# Patient Record
Sex: MALE | Race: WHITE | NOT HISPANIC OR LATINO | Employment: FULL TIME | URBAN - METROPOLITAN AREA
[De-identification: names, ages, dates, MRNs, and addresses within clinical notes are randomized per-mention and may not be internally consistent; named-entity substitution may affect disease eponyms.]

---

## 2019-01-17 ENCOUNTER — AMBULATORY - HEALTHEAST (OUTPATIENT)
Dept: CARDIAC REHAB | Facility: CLINIC | Age: 59
End: 2019-01-17

## 2019-01-17 DIAGNOSIS — I21.4 NON-ST ELEVATION MI (NSTEMI) (H): ICD-10-CM

## 2019-01-17 DIAGNOSIS — Z95.5 STENTED CORONARY ARTERY: ICD-10-CM

## 2019-03-01 ENCOUNTER — AMBULATORY - HEALTHEAST (OUTPATIENT)
Dept: CARDIAC REHAB | Facility: CLINIC | Age: 59
End: 2019-03-01

## 2019-03-01 DIAGNOSIS — Z95.5 STENTED CORONARY ARTERY: ICD-10-CM

## 2019-03-06 ENCOUNTER — AMBULATORY - HEALTHEAST (OUTPATIENT)
Dept: CARDIAC REHAB | Facility: CLINIC | Age: 59
End: 2019-03-06

## 2019-03-06 DIAGNOSIS — Z95.5 STENTED CORONARY ARTERY: ICD-10-CM

## 2019-03-06 DIAGNOSIS — I21.4 NSTEMI (NON-ST ELEVATION MYOCARDIAL INFARCTION) (H): ICD-10-CM

## 2019-03-06 RX ORDER — CLOPIDOGREL BISULFATE 75 MG/1
75 TABLET ORAL DAILY
Status: SHIPPED | COMMUNITY
Start: 2019-03-06

## 2019-03-06 RX ORDER — LISINOPRIL 10 MG/1
10 TABLET ORAL DAILY
Status: SHIPPED | COMMUNITY
Start: 2019-03-06

## 2019-03-06 RX ORDER — METOPROLOL TARTRATE 25 MG/1
25 TABLET, FILM COATED ORAL 2 TIMES DAILY
Status: SHIPPED | COMMUNITY
Start: 2019-03-06

## 2019-03-06 RX ORDER — ALBUTEROL SULFATE 90 UG/1
2 AEROSOL, METERED RESPIRATORY (INHALATION) EVERY 6 HOURS PRN
Status: SHIPPED | COMMUNITY
Start: 2019-03-06

## 2019-03-06 RX ORDER — LEVETIRACETAM 500 MG/1
500 TABLET ORAL DAILY
Status: SHIPPED | COMMUNITY
Start: 2019-03-06

## 2019-03-06 RX ORDER — ROSUVASTATIN CALCIUM 10 MG/1
10 TABLET, COATED ORAL AT BEDTIME
Status: SHIPPED | COMMUNITY
Start: 2019-03-06

## 2019-03-06 RX ORDER — NITROGLYCERIN 0.4 MG/1
0.4 TABLET SUBLINGUAL EVERY 5 MIN PRN
Status: SHIPPED | COMMUNITY
Start: 2019-03-06

## 2019-03-12 ENCOUNTER — AMBULATORY - HEALTHEAST (OUTPATIENT)
Dept: CARDIAC REHAB | Facility: CLINIC | Age: 59
End: 2019-03-12

## 2019-03-12 DIAGNOSIS — Z95.5 STENTED CORONARY ARTERY: ICD-10-CM

## 2019-03-12 DIAGNOSIS — I21.4 NSTEMI (NON-ST ELEVATION MYOCARDIAL INFARCTION) (H): ICD-10-CM

## 2019-03-14 ENCOUNTER — AMBULATORY - HEALTHEAST (OUTPATIENT)
Dept: CARDIAC REHAB | Facility: CLINIC | Age: 59
End: 2019-03-14

## 2019-03-14 DIAGNOSIS — I21.4 NSTEMI (NON-ST ELEVATION MYOCARDIAL INFARCTION) (H): ICD-10-CM

## 2019-03-14 DIAGNOSIS — Z95.5 STENTED CORONARY ARTERY: ICD-10-CM

## 2019-03-19 ENCOUNTER — AMBULATORY - HEALTHEAST (OUTPATIENT)
Dept: CARDIAC REHAB | Facility: CLINIC | Age: 59
End: 2019-03-19

## 2019-03-19 DIAGNOSIS — I21.4 NSTEMI (NON-ST ELEVATION MYOCARDIAL INFARCTION) (H): ICD-10-CM

## 2019-03-19 DIAGNOSIS — Z95.5 STENTED CORONARY ARTERY: ICD-10-CM

## 2019-03-21 ENCOUNTER — AMBULATORY - HEALTHEAST (OUTPATIENT)
Dept: CARDIAC REHAB | Facility: CLINIC | Age: 59
End: 2019-03-21

## 2019-03-21 DIAGNOSIS — I21.4 NSTEMI (NON-ST ELEVATION MYOCARDIAL INFARCTION) (H): ICD-10-CM

## 2019-03-21 DIAGNOSIS — Z95.5 STENTED CORONARY ARTERY: ICD-10-CM

## 2019-03-26 ENCOUNTER — AMBULATORY - HEALTHEAST (OUTPATIENT)
Dept: CARDIAC REHAB | Facility: CLINIC | Age: 59
End: 2019-03-26

## 2019-03-26 DIAGNOSIS — I21.4 NSTEMI (NON-ST ELEVATION MYOCARDIAL INFARCTION) (H): ICD-10-CM

## 2019-03-26 DIAGNOSIS — Z95.5 STENTED CORONARY ARTERY: ICD-10-CM

## 2019-03-28 ENCOUNTER — AMBULATORY - HEALTHEAST (OUTPATIENT)
Dept: CARDIAC REHAB | Facility: CLINIC | Age: 59
End: 2019-03-28

## 2019-03-28 DIAGNOSIS — I21.4 NSTEMI (NON-ST ELEVATION MYOCARDIAL INFARCTION) (H): ICD-10-CM

## 2019-03-28 DIAGNOSIS — Z95.5 STENTED CORONARY ARTERY: ICD-10-CM

## 2019-04-02 ENCOUNTER — AMBULATORY - HEALTHEAST (OUTPATIENT)
Dept: CARDIAC REHAB | Facility: CLINIC | Age: 59
End: 2019-04-02

## 2019-04-02 DIAGNOSIS — Z95.5 STENTED CORONARY ARTERY: ICD-10-CM

## 2019-04-02 DIAGNOSIS — I21.4 NSTEMI (NON-ST ELEVATION MYOCARDIAL INFARCTION) (H): ICD-10-CM

## 2019-04-09 ENCOUNTER — AMBULATORY - HEALTHEAST (OUTPATIENT)
Dept: CARDIAC REHAB | Facility: CLINIC | Age: 59
End: 2019-04-09

## 2019-04-09 DIAGNOSIS — I21.4 NSTEMI (NON-ST ELEVATION MYOCARDIAL INFARCTION) (H): ICD-10-CM

## 2019-04-09 DIAGNOSIS — Z95.5 STENTED CORONARY ARTERY: ICD-10-CM

## 2019-04-11 ENCOUNTER — AMBULATORY - HEALTHEAST (OUTPATIENT)
Dept: CARDIAC REHAB | Facility: CLINIC | Age: 59
End: 2019-04-11

## 2019-04-11 DIAGNOSIS — I21.4 NSTEMI (NON-ST ELEVATION MYOCARDIAL INFARCTION) (H): ICD-10-CM

## 2019-04-11 DIAGNOSIS — Z95.5 STENTED CORONARY ARTERY: ICD-10-CM

## 2019-04-16 ENCOUNTER — AMBULATORY - HEALTHEAST (OUTPATIENT)
Dept: CARDIAC REHAB | Facility: CLINIC | Age: 59
End: 2019-04-16

## 2019-04-16 DIAGNOSIS — I21.4 NSTEMI (NON-ST ELEVATION MYOCARDIAL INFARCTION) (H): ICD-10-CM

## 2019-04-16 DIAGNOSIS — Z95.5 STENTED CORONARY ARTERY: ICD-10-CM

## 2019-04-18 ENCOUNTER — AMBULATORY - HEALTHEAST (OUTPATIENT)
Dept: CARDIAC REHAB | Facility: CLINIC | Age: 59
End: 2019-04-18

## 2019-04-18 DIAGNOSIS — I21.4 NSTEMI (NON-ST ELEVATION MYOCARDIAL INFARCTION) (H): ICD-10-CM

## 2019-04-18 DIAGNOSIS — Z95.5 STENTED CORONARY ARTERY: ICD-10-CM

## 2019-04-23 ENCOUNTER — AMBULATORY - HEALTHEAST (OUTPATIENT)
Dept: CARDIAC REHAB | Facility: CLINIC | Age: 59
End: 2019-04-23

## 2019-04-23 DIAGNOSIS — I21.4 NSTEMI (NON-ST ELEVATION MYOCARDIAL INFARCTION) (H): ICD-10-CM

## 2019-04-23 DIAGNOSIS — Z95.5 STENTED CORONARY ARTERY: ICD-10-CM

## 2019-04-25 ENCOUNTER — AMBULATORY - HEALTHEAST (OUTPATIENT)
Dept: CARDIAC REHAB | Facility: CLINIC | Age: 59
End: 2019-04-25

## 2019-04-25 DIAGNOSIS — I21.4 NSTEMI (NON-ST ELEVATION MYOCARDIAL INFARCTION) (H): ICD-10-CM

## 2019-04-25 DIAGNOSIS — Z95.5 STENTED CORONARY ARTERY: ICD-10-CM

## 2019-04-30 ENCOUNTER — AMBULATORY - HEALTHEAST (OUTPATIENT)
Dept: CARDIAC REHAB | Facility: CLINIC | Age: 59
End: 2019-04-30

## 2019-04-30 DIAGNOSIS — I21.4 NSTEMI (NON-ST ELEVATION MYOCARDIAL INFARCTION) (H): ICD-10-CM

## 2019-04-30 DIAGNOSIS — Z95.5 STENTED CORONARY ARTERY: ICD-10-CM

## 2019-05-02 ENCOUNTER — AMBULATORY - HEALTHEAST (OUTPATIENT)
Dept: CARDIAC REHAB | Facility: CLINIC | Age: 59
End: 2019-05-02

## 2019-05-02 DIAGNOSIS — Z95.5 STENTED CORONARY ARTERY: ICD-10-CM

## 2019-05-02 DIAGNOSIS — I21.4 NSTEMI (NON-ST ELEVATION MYOCARDIAL INFARCTION) (H): ICD-10-CM

## 2019-05-07 ENCOUNTER — AMBULATORY - HEALTHEAST (OUTPATIENT)
Dept: CARDIAC REHAB | Facility: CLINIC | Age: 59
End: 2019-05-07

## 2019-05-07 DIAGNOSIS — I21.4 NSTEMI (NON-ST ELEVATION MYOCARDIAL INFARCTION) (H): ICD-10-CM

## 2019-05-07 DIAGNOSIS — Z95.5 STENTED CORONARY ARTERY: ICD-10-CM

## 2019-05-09 ENCOUNTER — AMBULATORY - HEALTHEAST (OUTPATIENT)
Dept: CARDIAC REHAB | Facility: CLINIC | Age: 59
End: 2019-05-09

## 2019-05-09 DIAGNOSIS — Z95.5 STENTED CORONARY ARTERY: ICD-10-CM

## 2019-05-09 DIAGNOSIS — I21.4 NSTEMI (NON-ST ELEVATION MYOCARDIAL INFARCTION) (H): ICD-10-CM

## 2019-05-14 ENCOUNTER — AMBULATORY - HEALTHEAST (OUTPATIENT)
Dept: CARDIAC REHAB | Facility: CLINIC | Age: 59
End: 2019-05-14

## 2019-05-14 DIAGNOSIS — Z95.5 STENTED CORONARY ARTERY: ICD-10-CM

## 2019-05-14 DIAGNOSIS — I21.4 NSTEMI (NON-ST ELEVATION MYOCARDIAL INFARCTION) (H): ICD-10-CM

## 2019-05-16 ENCOUNTER — AMBULATORY - HEALTHEAST (OUTPATIENT)
Dept: CARDIAC REHAB | Facility: CLINIC | Age: 59
End: 2019-05-16

## 2019-05-16 DIAGNOSIS — I21.4 NSTEMI (NON-ST ELEVATION MYOCARDIAL INFARCTION) (H): ICD-10-CM

## 2019-05-16 DIAGNOSIS — Z95.5 STENTED CORONARY ARTERY: ICD-10-CM

## 2019-05-21 ENCOUNTER — AMBULATORY - HEALTHEAST (OUTPATIENT)
Dept: CARDIAC REHAB | Facility: CLINIC | Age: 59
End: 2019-05-21

## 2019-05-21 DIAGNOSIS — Z95.5 STENTED CORONARY ARTERY: ICD-10-CM

## 2019-05-21 DIAGNOSIS — I21.4 NSTEMI (NON-ST ELEVATION MYOCARDIAL INFARCTION) (H): ICD-10-CM

## 2019-05-23 ENCOUNTER — AMBULATORY - HEALTHEAST (OUTPATIENT)
Dept: CARDIAC REHAB | Facility: CLINIC | Age: 59
End: 2019-05-23

## 2019-05-23 DIAGNOSIS — I21.4 NSTEMI (NON-ST ELEVATION MYOCARDIAL INFARCTION) (H): ICD-10-CM

## 2019-05-23 DIAGNOSIS — Z95.5 STENTED CORONARY ARTERY: ICD-10-CM

## 2019-05-28 ENCOUNTER — AMBULATORY - HEALTHEAST (OUTPATIENT)
Dept: CARDIAC REHAB | Facility: CLINIC | Age: 59
End: 2019-05-28

## 2019-05-28 DIAGNOSIS — I21.4 NSTEMI (NON-ST ELEVATION MYOCARDIAL INFARCTION) (H): ICD-10-CM

## 2019-05-28 DIAGNOSIS — Z95.5 STENTED CORONARY ARTERY: ICD-10-CM

## 2019-05-30 ENCOUNTER — AMBULATORY - HEALTHEAST (OUTPATIENT)
Dept: CARDIAC REHAB | Facility: CLINIC | Age: 59
End: 2019-05-30

## 2019-05-30 DIAGNOSIS — I21.4 NSTEMI (NON-ST ELEVATION MYOCARDIAL INFARCTION) (H): ICD-10-CM

## 2019-05-30 DIAGNOSIS — Z95.5 STENTED CORONARY ARTERY: ICD-10-CM

## 2021-05-27 NOTE — PROGRESS NOTES
ITP ASSESSMENT   Assessment Day: 30 Day    Session Number: 9  Precautions: Standard cardiac sx, factor 5, s/p TIA, epilepsy, asthma, bicuspid AV/AS    Diagnosis: MI;Stent    Risk Stratification: High    Referring Provider: Vicente Lockett MD   ITP sent to Dr. Chu  EXERCISE  Exercise Assessment: Reassessment         Symptoms:  Peak Symptoms: calf soreness      5 mins. Post  5 Min Post HR: 73    5 Min Post BP: 102/62                           Exercise Plan  Goals Next 30 days  ADL'S: Resume yardwork    Leisure: Resume [employer]    Work: Independent with duties.      Education Goals: Patient can state cardiac s/s and appropriate emergency response.    Education Goals Met: Has system for taking medication.;Medication review.                          Goals Met  Initial ADL's goals met: Goal met- pt has carried 15 lbs up stairs.    Initial Leisure goals met: Goal met- pt has resumed vacuuming.    Intial Work goals met: Independent      Exercise Prescription  Exercise Mode: Treadmill;Bike;Nustep;Arm Erg.;Stairs;Hallway Walking    Frequency: 2x/week    Duration: 30-45 mins    Intensity / THR: 20-30 beats above resting heart rate    RPE 11-14  Progression / Met level: 5.5-7    Resistive Training?: Yes      Current Exercise (mins/week): 180      Interventions  Home Exercise:  Mode: Walking    Frequency: 3-5x/week    Duration: 20-40 mins      Education Material : Educational videos;Provide written material;Offer educational classes;Individual education and counseling      Education Completed  Exercise Education Completed: Cardiac Anatomy;Signs and Symptoms;Medication review;RPE;Emergency Plan;Home Exercise;Warm up/cool down;FITT Principles;BP/HR Reponse to exercise;Stretching;Strength training;Benefits of Exercise;End point of exercise              Exercise Follow-up/Discharge  Follow up/Discharge: Pt has reached peak of 5.2 METs on TM.  Pt reports walking regularly at home and at work.           NUTRITION  Nutrition  "Assessment: Reassessment      Nutrition Risk Factors:  Nutrition Risk Factors: Dyslipidemia;Overweight  Cholesterol: 242 (1/12/19)  LDL: 172  HDL: 41  Triglycerides: 145      Nutrition Plan  Interventions  Diet Consult: Completed    Other Nutrition Intervention: Therapist/Pt Discussion;Educational Videos;Provide with Written Material    Initial Rate Your Plate Score: 66      Education Completed  Nutrition Education Completed: Low Saturated fat diet;Low sodium diet      Goals  Nutrition Goals (Next 30 days): Patient will lose weight      Goals Met  Nutrition Goals Met: Patient follows a low sodium diet;Patient states following a low saturated fat diet;Patient knows appropriate portion size;Patient has lost weight;Completed Nutritional Risk Screen;Provided Rate your Plate Survey;Reviewed Dietitian schedule      Height, Weight, and  BMI  Weight: 176 lb (79.8 kg)  Height: 5' 6\" (1.676 m)  BMI: 28.42      Nutrition Follow-up  Follow-up/Discharge: Pt has met with dietician and reports he is following a low sodium diet.  He wants to lose at least 10 more lbs.       Other Risk Factors  Other Risk Factor Assessment: Reassessment      HTN Risk Factor: Hypertension      Pre Exercise BP: 116/72  Post Exercise BP: 100/60      Hypertension Plan  Goals  HTN Goals: Patient demonstrates understanding of HTN, no goals identified for the next 30 days      Goals Met  HTN Goals Met: Follow low sodium diet;Take medication as prescribed;Exercises regularly      HTN Interventions  HTN Interventions: Diet consult;Therapist/patient discussion;Provide written material;Offer educational classes      HTN Education Completed  HTN Education Completed: Low sodium diet;Medication review;Risk factor overview      Tobacco Risk Factor: NA        Risk Factor Follow-up   Follow-up/Discharge: Pt reports following low sodium diet.         PSYCHOSOCIAL  Psychosocial Assessment: Reassessment       [facility] L Summary Score: 22      DANIAL-D Score: " 9      Psychosocial Risk Factor: NA      Psychosocial Plan  Interventions  Interventions: Offer educational videos and classes;Provide written material;Individual education and counseling      Education Completed  Education Completed: Relaxation/Coping Techniques;S/S of depression;Effects of stress on body      Goals  Goals (Next 30 days): Patient demonstrates understanding of stress, no goals identified for the next 30 days      Goals Met  Goals Met: Identified Support system;Identify stressors;Practicing stress management skills      Psychosocial Follow-up  Follow-up/Discharge: Pt reports having relatively low stress, except for occ work stress.  He identifies a good support system, and manages stress he does get through exercise, internet surfing, golfing, and meditation.           Patient involved in Goal setting?: Yes      Signature: _____________________________________________________________    Date: __________________    Time: __________________

## 2021-05-28 NOTE — PROGRESS NOTES
ITP ASSESSMENT   Assessment Day: 60 Day    Session Number: 16  Precautions: Standard cardiac sx/sx    Diagnosis: MI;Stent    Risk Stratification: High    Referring Provider: Vicente Lockett MD   ITP: Dr. Chu  EXERCISE  Exercise Assessment: Reassessment       6 Minute Walk Test   Pre   Pre Exercise HR: 75                    Pre Exercise BP: 112/66      Peak  Peak HR: 106                   Peak BP: 122/62    Peak feet: 1700    Peak O2 SAT: 97    Peak RPE: 10-11    Peak MPH: 3.22      Symptoms:  Peak Symptoms: calf soreness      5 mins. Post  5 Min Post HR: 73    5 Min Post BP: 102/62                           Exercise Plan  Goals Next 30 days  ADL'S: Pt has not resumed yardwork due to weather-keep same goal- Pt to mow the lawn without any complications.     Leisure: Pt to resume elliptical 20-30 minutes 1-2x/week outside of cardiac rehab with less fatigue.     Work: Pt is independent with duties.       Education Goals: All goals in this section met    Education Goals Met: Medication review.;Has system for taking medication.;Patient can state cardiac s/s and appropriate emergency response.                          Goals Met  30 day ADL'S goals met: Pt has not returned to yardwork due to the weather. Pt hopes over the next 30 days he will. Pt feels he could but will keep as a goal for the next 30 days.     30 day Leisure goals met: Pt has resumed golfing without cart without any complications. Pt golfed 9 holes    30 day Work goals met: Pt is independent with work duties.     30 Day Progression: Pt has reached a 6.5 MET for 28-35 min on the TM utilizing interval training. Pt also has started weights. Pt declines other modalities due to time b/c of getting to work and likes to get the interval on the TM.       Initial ADL's goals met: Goal met- pt has carried 15 lbs up stairs.    Initial Leisure goals met: Goal met- pt has resumed vacuuming.    Intial Work goals met: Independent    No data recorded    Exercise  Prescription  Exercise Mode: Treadmill;Bike;Nustep;Arm Erg.;Stairs;Hallway Walking (Pt prefers to do mostly the TM-intervals)    Frequency: 2x/week    Duration: 35-45 minutes    Intensity / THR:  (Karvon[place] 122-136bpm)    RPE 11-14  Progression / Met level: 6.5-7+    Resistive Training?: Yes      Current Exercise (mins/week): 180      Interventions  Home Exercise:  Mode: Walking, Elliptical    Frequency: 2-3x/week    Duration: 30+ minutes      Education Material : Educational videos;Provide written material;Individual education and counseling;Offer educational classes      Education Completed  Exercise Education Completed: Cardiac Anatomy;Signs and Symptoms;Medication review;RPE;Emergency Plan;Home Exercise;Warm up/cool down;FITT Principles;BP/HR Reponse to exercise;Stretching;Strength training;Benefits of Exercise;End point of exercise              Exercise Follow-up/Discharge  Follow up/Discharge: Pt has reached a max MET level of 6.5 MET for 28-35 minutes utilizing interval training. Pt LTG is 6-8+ METS and get back on his elliptical. Pt has liked started weights. Pt is exercising regularly and is pleased with progress.    NUTRITION  Nutrition Assessment: Reassessment      Nutrition Risk Factors:  Nutrition Risk Factors: Dyslipidemia;Overweight  Cholesterol: 133 (4/13/19)  LDL: 79  HDL: 35  Triglycerides: 94      Nutrition Plan  Interventions  Diet Consult: Completed    Other Nutrition Intervention: Therapist/Pt Discussion;Provide with Written Material    Initial Rate Your Plate Score: 66      Education Completed  Nutrition Education Completed: Low Saturated fat diet;Risk factor overview;Low sodium diet;Weight management      Goals  Nutrition Goals (Next 30 days): Patient will lose weight;Improve Rate Your Plate Survey Score      Goals Met  Nutrition Goals Met: Completed Nutritional Risk Screen;Provided Rate your Plate Survey;Reviewed Dietitian schedule;Patient can identify their risk factors for CAD;Patient  "follows a low sodium diet;Patient states following a low saturated fat diet;Patient knows appropriate portion size;Patient has lost weight      Height, Weight, and  BMI  Weight: 172 lb 8 oz (78.2 kg)  Height: 5' 6\" (1.676 m)  BMI: 27.86      Nutrition Follow-up  Follow-up/Discharge: Pt met with the dietician and has made great diet changes. Pt is following a low fat and low salt diet. Pt's wife does the cooking. Pt is eating more vegetables and salads. Pt has cut way down on the snacking- no more chips. Pt is drinking water for hydration.  Pt has cut down drastically on red meat eating chicken and turkey. Pt is bringing lunch to work and eats mixed nuts unsalted for snacks. Pt continues to lose weight and is hopeful to continue with weight loss. Pt is very pleased and had a great decrease in his cholesterol levels.          Other Risk Factors  Other Risk Factor Assessment: Reassessment      HTN Risk Factor: Hypertension      Pre Exercise BP: 100/60  Post Exercise BP: 98/60 (asymptomatic)      Hypertension Plan  Goals  HTN Goals: Patient demonstrates understanding of HTN, no goals identified for the next 30 days      Goals Met  HTN Goals Met: Follow low sodium diet;Take medication as prescribed;Exercises regularly      HTN Interventions  HTN Interventions: Diet consult;Therapist/patient discussion;Provide written material;Offer educational videos;Offer educational classes      HTN Education Completed  HTN Education Completed: Low sodium diet;Medication review;Risk factor overview      Tobacco Risk Factor: NA      Risk Factor Follow-up   Follow-up/Discharge: Pt aware of risk factors.      PSYCHOSOCIAL  Psychosocial Assessment: Reassessment         Psychosocial Risk Factor: NA      Psychosocial Plan  Interventions  Interventions: Offer educational videos and classes;Provide written material;Individual education and counseling      Education Completed  Education Completed: Relaxation/Coping Techniques;S/S of " depression;Effects of stress on body      Goals  Goals (Next 30 days): [facility]      Goals Met  Goals Met: Identified Support system;Oriented to stress management classes;Identify stressors;Practicing stress management skills      Psychosocial Follow-up  Follow-up/Discharge: Pt denies stress as a problem for him. Pt does have occasional stress at work. Pt feels his family is a good support system. Pt is looking forward to summer to get back out golfing.  Pt does like to exercise for relaxation and does meditate. Pt takes lunch breaks to walk at work.              Patient involved in Goal setting?: Yes

## 2021-05-29 NOTE — PROGRESS NOTES
ITP ASSESSMENT   Assessment Day: 90 Day (Last Day)    Session Number: 25  Precautions: Standard cardiac sx/sx    Diagnosis: MI;Stent    Risk Stratification: High    Referring Provider: Vicente Lockett MD   ITP: Dr. Chu  EXERCISE  Exercise Assessment: Discharge       6 Minute Walk Test   Pre   Pre Exercise HR: 65                    Pre Exercise BP: 106/64      Peak  Peak HR: 100                   Peak BP: 118/60    Peak feet: 1800    Peak O2 SAT: 96    Peak RPE: 10    Peak MPH: 3.41      Symptoms:  Peak Symptoms: 1/10 shin discomfort      5 mins. Post  5 Min Post HR: 66    5 Min Post BP: 98/62                           Exercise Plan      Education Goals: All goals in this section met    Education Goals Met: Medication review.;Has system for taking medication.;Patient can state cardiac s/s and appropriate emergency response.                          Goals Met  60 day ADL'S goals met: Pt has resumed mowing the lawn and yardwork without complications.     60 day Leisure goals met: Pt has been doing the elliptical 1x/week outside of cardiac rehab. Pt does prefer to walk over the elliptical. Pt continues to notice improvement in fatigue with this. His elliptical does not change levels well so it is pretty consistent on one level.     60 day Work goals met: Pt is independent with work duties and works full time without any complications.     60 Day Progression: Pt has reached a max MET level of 7.9 METs for 33 min on the TM utilizing interval training. Pt has reached a 6.4-6.8 MET on the [place] for 20-30 minutes. Pt has been utizling weights in rehab.       30 day ADL'S goals met: Pt has not returned to yardwork due to the weather. Pt hopes over the next 30 days he will. Pt feels he could but will keep as a goal for the next 30 days.     30 day Leisure goals met: Pt has resumed golfing without cart without any complications. Pt golfed 9 holes    30 day Work goals met: Pt is independent with work duties.     30 Day  Progression: Pt has reached a 6.5 MET for 28-35 min on the TM utilizing interval training. Pt also has started weights. Pt declines other modalities due to time b/c of getting to work and likes to get the interval on the TM.       Initial ADL's goals met: Goal met- pt has carried 15 lbs up stairs.    Initial Leisure goals met: Goal met- pt has resumed vacuuming.    Intial Work goals met: Independent      Exercise Prescription    Intensity / THR: (Karvonen 122-136bpm)        Current Exercise (mins/week): 180      Interventions  Home Exercise:  Mode: Walking, Elliptical    Frequency: 4-6x/week    Duration: 30+ minutes      Education Material : Educational videos;Provide written material;Individual education and counseling;Offer educational classes      Education Completed  Exercise Education Completed: Cardiac Anatomy;Signs and Symptoms;Medication review;RPE;Emergency Plan;Home Exercise;Warm up/cool down;FITT Principles;BP/HR Reponse to exercise;Stretching;Strength training;Benefits of Exercise;End point of exercise              Exercise Follow-up/Discharge  Follow up/Discharge: Pt has reached a max MET level of 7.9 MET for 33 minutes utilizing interval training. Pt is back to all activities without complications and has reached MET level goals. Pt will be walking and utilizing elliptical at home. Pt feels he got what he needed out of cardiac rehab and is very pleased with progress and wishes to be graduated at this time. Pt feels better physically and emotionally since startnig cardiac rehab. PT has noticed an increase in endurance and stamina.Pt had improvement in 6'walk.    NUTRITION  Nutrition Assessment: Discharge      Nutrition Risk Factors:  Nutrition Risk Factors: Dyslipidemia;Overweight  Cholesterol: 133 (4/13/19)  LDL: 79  HDL: 35  Triglycerides: 94      Nutrition Plan  Interventions  Diet Consult: Completed    Other Nutrition Intervention: Therapist/Pt Discussion;Provide with Written Material    Initial  "Rate Your Plate Score: 66      Education Completed  Nutrition Education Completed: Low Saturated fat diet;Risk factor overview;Low sodium diet;Weight management          Goals Met  Nutrition Goals Met: Patient can identify their risk factors for CAD;Patient follows a low sodium diet;Completed Nutritional Risk Screen;Provided Rate your Plate Survey;Reviewed Dietitian schedule;Patient states following a low saturated fat diet;Patient knows appropriate portion size      Height, Weight, and  BMI  Weight: 174 lb (78.9 kg)  Height: 5' 6\" (1.676 m)  BMI: 28.1      Nutrition Follow-up  Follow-up/Discharge: Pt met with dietician and has made great changes in his diet. Pt continues to follow a low fat and low salt diet. Pt is eating very very little red meat and more turkey and chicken and fish. Pt is eating fruits and vegetables and nuts. Pt is making healthy choices when eating out. Pt is working on increasing his water intake and decreasing his caffeine intake. Pt continues to monitor portions and is very pleased with his diet changes. Pt has no other questions at this time.          Other Risk Factors  Other Risk Factor Assessment: Discharge      HTN Risk Factor: Hypertension      Pre Exercise BP: 106/64  Post Exercise BP: 102/62      Hypertension Plan    Goals Met  HTN Goals Met: Exercises regularly;Take medication as prescribed;Follow low sodium diet      HTN Interventions  HTN Interventions: Diet consult;Therapist/patient discussion;Provide written material;Offer educational videos;Offer educational classes      HTN Education Completed  HTN Education Completed: Low sodium diet;Medication review;Risk factor overview      Tobacco Risk Factor: NA          Risk Factor Follow-up   Follow-up/Discharge: Pt aware of risk factors.      PSYCHOSOCIAL  Psychosocial Assessment: Discharge       [facility] KERRIE Q of L Summary Score: 17      DANIAL-D: 13  Psychosocial Risk Factor: NA      Psychosocial Plan  Interventions  Interventions: " Offer educational videos and classes;Provide written material;Individual education and counseling      Education Completed  Education Completed: Relaxation/Coping Techniques;S/S of depression;Effects of stress on body          Goals Met  Goals Met: Identified Support system;Oriented to stress management classes;Identify stressors;Practicing stress management skills;Improvement in [facility] COOP score ([facility] 22-17)      Psychosocial Follow-up  Follow-up/Discharge: PT denies stress and I did review the increase in his DANIAL-D score from the beginning with him and he reports things are going fine and it changes every week. Pt is coping well with his diagnosis and has increased his confidence in doing activities . Pt feels better both physically and emotionally since starting when it comes to his heart health. Pt is looking forward to summer and golfing more. Pt does like to walk and utilize meditation for relaxation. Pt takes walks on his lunch break. Pt enjoyed cardiac rehab.              Patient involved in Goal setting?: Yes      LD completed.

## 2021-05-29 NOTE — PROGRESS NOTES
[facility] Home Exercise Program/Discharge Summary  You have reached a 7.9 MET level and have completed 25 sessions of Cardiac Rehab.   Exercise Goals:   4-6x/week for aerobic exercise 30-60 minutes and 2-3x/week for strength training.   Modality Duration Intensity/  Rate of Perceived Exertion  (RPE: 11-14)   Warm-up 5 minutes 8-10   Walk 25-30 minutes 11-14   Bike/Elliptical 20-25 minutes 11-14   Cool Down 5 minutes 8-10   Strength Training *every other day 10-15 minutes 12-14   Stretching 5 minutes 8-10   Heart Rate Guidelines:   Straight Aerobic Exercise: Karvonen (60 % to 75%: 122-136 bpm)  Interval training:  MIIT (3-4 minutes) : HR: 112-122bpm  HIIT (1-2 minutes): HR: 141bpm  Alternate between MIIT and HIIT   X 5 cycles  Special Recommendations:    Continue to follow low fat, low salt, heart healthy diet.    Continue to follow up with your doctors/providers as recommended (e.g cholesterol).    A well rounded exercise program will included aerobic/cardiovascular exercise (e.g like walking, biking, or swimming ), strength training (e.g. free weights, exercise bands, or weight machines) and stretching program.   Stop Exercise!!! If any of the following occur:    Angina/chest pain    Dizziness    Excessive perspiration/cold sweats    Abnormal shortness of breath    Changes in heart rate (slow, fast, irregular)    Sudden fatigue or numbness    Nausea  Also...    Avoid extreme temperatures - exercise indoors if necessary:   Temp+ Humidity >160, Temp-Wind Chill <20    Wait at least 1 hour after a meal before strenuous activity    Do not exercise if you have a fever or are ill    Wear comfortable, supportive athletic clothing and shoes.  You are now on your way to a heart healthy lifestyle on your own. You can do it!

## 2021-06-02 VITALS — WEIGHT: 174 LBS

## 2021-06-02 VITALS — WEIGHT: 176.2 LBS

## 2021-06-02 VITALS — WEIGHT: 179 LBS

## 2021-06-02 VITALS — WEIGHT: 178 LBS

## 2021-06-02 VITALS — WEIGHT: 174.4 LBS

## 2021-06-02 VITALS — WEIGHT: 176 LBS

## 2021-06-02 VITALS — WEIGHT: 175 LBS

## 2021-06-02 VITALS — WEIGHT: 176.8 LBS

## 2021-06-02 VITALS — WEIGHT: 173.5 LBS

## 2021-06-03 VITALS — WEIGHT: 172.5 LBS

## 2021-06-03 VITALS — WEIGHT: 174 LBS

## 2021-06-03 VITALS — WEIGHT: 173 LBS

## 2021-06-03 VITALS — WEIGHT: 175 LBS

## 2021-06-03 VITALS — WEIGHT: 173.5 LBS

## 2021-06-24 NOTE — PROGRESS NOTES
ITP ASSESSMENT   Assessment Day: Initial    Session Number: 1/2  Precautions: Standard CAD Precautions, Factor 5, TIA, Epilepsy, Asthma, Bicuspid AV/AS    Diagnosis: MI;Stent    Risk Stratification: High    Referring Provider: SHANNON Sky at [facility]  ITP: Dr. Chu  EXERCISE  Exercise Assessment: Initial       6 Minute Walk Test   Pre   Pre Exercise HR: 75                    Pre Exercise BP: 112/66      Peak  Peak HR: 106                   Peak BP: 122/62    Peak feet: 1700    Peak O2 SAT: 97    Peak RPE: 10-11    Peak MPH: 3.22      Symptoms:  Peak Symptoms: calf soreness      5 mins. Post  5 Min Post HR: 73    5 Min Post BP: 102/62                           Exercise Plan  Goals Next 30 days  ADL'S: Carry <15-30lbs upstairs ~5-5.5METs    Leisure: Resume vacuuming, snowblowing (4-4.5METs)    Work: Independent with all work duties. Pt work IT systems.       Education Goals: Patient can state cardiac s/s and appropriate emergency response.    Education Goals Met: Has system for taking medication.;Medication review.      Exercise Prescription  Exercise Mode: Treadmill;Bike;Nustep;Arm Erg.;Stairs;Hallway Walking    Frequency: 2x/week    Duration: 30-50minutes    Intensity / THR: 20-30 beats above resting heart rate    RPE 11-14  Progression / Met level: 3.5-5+    Resistive Training?: Yes      Current Exercise (mins/week): 150      Interventions  Home Exercise:  Mode: Walking     Frequency: 2-3x/week on non rehab days    Duration: 30minutes      Education Material : Educational videos;Provide written material;Offer educational classes;Individual education and counseling      Education Completed  Exercise Education Completed: Cardiac Anatomy;Signs and Symptoms;Medication review;RPE;Emergency Plan;Home Exercise;Warm up/cool down;FITT Principles;BP/HR Reponse to exercise;Stretching;Strength training;Benefits of Exercise;End point of exercise              Exercise Follow-up/Discharge  Follow up/Discharge: Pt is  "currenly walking the skyways at work for 30' x5/week and tolerating well. Pt reports he has had no angina since stents placed. Progress pt as tolerated to goals. LTG is 6-10. Pt has only a rower and elliptical at home and would like to be able to get back to that adventuall. Encouraged pt to continue walking on non rehab days   NUTRITION  Nutrition Assessment: Initial      Nutrition Risk Factors:  Nutrition Risk Factors: Dyslipidemia;Overweight  Cholesterol: 242  LDL: 1722  HDL: 41  Triglycerides: 145      Nutrition Plan  Interventions  Diet Consult: NA (Initially decline-offer dietician consult again near future.)    Other Nutrition Intervention: Diet Class;Therapist/Pt Discussion;Provide with Written Material    Initial Rate Your Plate Score: 0 (Diet Habit survey given-pt will complete before next session)      Education Completed  Nutrition Education Completed: Low Saturated fat diet;Risk factor overview;Low sodium diet      Goals  Nutrition Goals (Next 30 days): Patient can identify their risk factors for CAD;Patient will follow a low sodium diet;Review Dietitian schedule;Patient will follow a low saturated fat diet;Patient knows appropriate portion size      Goals Met  Nutrition Goals Met: Patient follows a low sodium diet;Patient states following a low saturated fat diet;Provided Rate your Plate Survey;Completed Nutritional Risk Screen      Height, Weight, and  BMI  Weight: 179 lb (81.2 kg)  Height: 5' 6\" (1.676 m)  BMI: 28.91      Nutrition Follow-up  Follow-up/Discharge: Pt would like to lose inches vs weight. He does not agree with the BMI scale but wants to lose inches for sure. Pt initially has declined to meet with dietician. He is currently eating low fat and low salt and feels comfortable with that at this time.         Other Risk Factors  Other Risk Factor Assessment: Initial      HTN Risk Factor: Hypertension      Pre Exercise BP: 112/66  Post Exercise BP: 102/62      Hypertension Plan  Goals  HTN " Goals: Patient demonstrates understanding of HTN, no goals identified for the next 30 days      Goals Met  HTN Goals Met: Follow low sodium diet;Take medication as prescribed;Exercises regularly      HTN Interventions  HTN Interventions: Diet consult;Therapist/patient discussion;Provide written material;Offer educational classes      HTN Education Completed  HTN Education Completed: Low sodium diet;Medication review;Risk factor overview      Tobacco Risk Factor: NA      Risk Factor Follow-up   Follow-up/Discharge: CRF: Family Hx, HTN, HLD, and Weight     PSYCHOSOCIAL  Psychosocial Assessment: Initial       [facility] Q of L Summary Score: 0 (Pt requested to take surveys home and complete)      DANIAL-D Score: 0 (Pt requested to take survey home to complete)      Psychosocial Risk Factor: NA      Psychosocial Plan  Interventions  If DANIAL-D > 15 send letter to MD  Interventions: Offer educational videos and classes;Provide written material;Individual education and counseling      Education Completed  Education Completed: Relaxation/Coping Techniques;S/S of depression;Effects of stress on body      Goals  Goals (Next 30 days): Patient demonstrates understanding of stress, no goals identified for the next 30 days      Goals Met  Goals Met: Identified Support system;Identify stressors;Practicing stress management skills      Psychosocial Follow-up  Follow-up/Discharge: Pt states that stress is not a concern for him. He currently manages stressors through watching TV, surfing the web and would like to get back to golf and meditation.              Patient involved in Goal setting?: Yes

## 2021-06-24 NOTE — PROGRESS NOTES
Cardiac Rehab  Phase II Assessment    Assessment Date: 3/6/2019    Diagnosis: NSTEMI, Stent    Date of Onset: 1/11/19  Procedure: PCI vs CABG. PCI with stents x6   Date of Onset: 1/11/19, 2/15/19  ICD/Pacemaker: No Parameters: N/A  Post-op Complications: Declined CABG  ECG History: SR EF%:50  Past Medical History:   CAD, multiple vessel 01/24/2019   Stented coronary artery 01/11/2019   Overview:     Patient is on Clopidogrel (Plavix) following stent placement.  Date of Intervention: 2/15/2019   Type of Stent: [place]  Patient is expected to continue taking Clopidogrel (Plavix) for one year (until 2/15/2020) unless otherwise advised due to subsequent stent placement or continued bleeding..     Malignant neoplasm of prostate 10/02/2017   Overview:     Added automatically from request for surgery [id number]     Transient ischemic attack (TIA), and cerebral infarction without residual deficits(V12.54) 03/24/2015   Overview:     CVA without residual deficits(V12.54)     Phlebitis and thrombophlebitis of superficial veins of upper extremities 03/24/2015   Essential hypertension 03/24/2015   Overview:     Epic      Hyperlipidemia 03/24/2015   Localization-related focal epilepsy with simple partial seizures 03/09/2015   Overview:     Epic      Prostate cancer 02/14/2015   Cancer Staging:     Clinical: Stage I (T1c, N0, M0) - Signed by Mirta Ellington MD on 7/29/2015     Overview:     Diagnosed Feb 2015     Herpes 05/03/2010   Dry, eye 10/21/2008   Blepharitis 10/21/2008   Overview:     Epic      Exercise-induced asthma      Bicuspid AV/AS    Migraine w/Aura    HTN    Surgery on Right Ankle    Physical Assessment  Precautions/ Physical Limitations: CAD, Asthma, Factor 5, TIA, Epilepsy, Trace Aortic Regurgitation with Bicuspid AV  Oxygen: No  O2 Sats: 97 Lung Sounds: clear Edema: none  Incisions: healed well  Sleeping Pattern: fair   Appetite: good   Nutrition Risk Screen: Lose inches    Pain  Location:  -  Characteristics:-  Intensity: (0-10 scale) 0  Current Pain Management: -  Intervention: -  Response: -    Psychosocial/ Emotional Health  1. In the past 12 months, have you been in a relationship where you have been abused physically, emotionally, sexually or financially? No  notified: NA  2. Who do you turn to for emotional support?: family-immediate family  3. Do you have cultural or spiritual needs? No  4. Have there been any major life changes in the past 12 months? Yes Stents-Pt is in denial about NSTEMI    Referral Information  Primary Physician: Vicente Lockett MD  Cardiologist: at [facility] Dr. Garg-ordering and Dr. Desai  Surgeon: Declined CABG Dr. Rebolledo    Home exercise/Equipment: Rower and Elliptical    Patient's long-term goal(s) (also see below recreational/hobbies: Lose inches, increase strength and stamina and resume all activities and exercise as he wants to be able to push himself.     1. Living Accommodations: Home Steps: Yes      Support people at home: wife and son   2. Marital Status:   3. Family is able to assist with cares      Mu-ism/Community involvement: Qu[employer]-not a regular attendee  4. Recreation/Hobbies/Goals: golf-wants to get back to walking 18 hole course, mowing lawn without chest pain, Hockey-coaching/playing mens non contact league, Bicycle outdoors, light home repairs

## 2024-09-06 ENCOUNTER — TRANSCRIBE ORDERS (OUTPATIENT)
Dept: OTHER | Age: 64
End: 2024-09-06

## 2024-09-06 DIAGNOSIS — Z95.3 STATUS POST TRANSCATHETER AORTIC VALVE REPLACEMENT (TAVR) USING BIOPROSTHESIS: Primary | ICD-10-CM

## 2024-09-10 ENCOUNTER — TRANSCRIBE ORDERS (OUTPATIENT)
Dept: OTHER | Age: 64
End: 2024-09-10

## 2024-09-19 ENCOUNTER — HOSPITAL ENCOUNTER (OUTPATIENT)
Dept: CARDIAC REHAB | Facility: CLINIC | Age: 64
Discharge: HOME | End: 2024-09-19
Attending: INTERNAL MEDICINE
Payer: COMMERCIAL

## 2024-09-19 PROCEDURE — 93797 PHYS/QHP OP CAR RHAB WO ECG: CPT

## 2024-09-19 PROCEDURE — 93798 PHYS/QHP OP CAR RHAB W/ECG: CPT

## 2024-09-23 ENCOUNTER — HOSPITAL ENCOUNTER (OUTPATIENT)
Dept: CARDIAC REHAB | Facility: CLINIC | Age: 64
Discharge: HOME | End: 2024-09-23
Attending: INTERNAL MEDICINE
Payer: COMMERCIAL

## 2024-09-23 PROCEDURE — 93798 PHYS/QHP OP CAR RHAB W/ECG: CPT

## 2024-09-25 ENCOUNTER — HOSPITAL ENCOUNTER (OUTPATIENT)
Dept: CARDIAC REHAB | Facility: CLINIC | Age: 64
Discharge: HOME | End: 2024-09-25
Attending: INTERNAL MEDICINE
Payer: COMMERCIAL

## 2024-09-25 PROCEDURE — 93798 PHYS/QHP OP CAR RHAB W/ECG: CPT

## 2024-09-27 ENCOUNTER — HOSPITAL ENCOUNTER (OUTPATIENT)
Dept: CARDIAC REHAB | Facility: CLINIC | Age: 64
Discharge: HOME | End: 2024-09-27
Attending: INTERNAL MEDICINE
Payer: COMMERCIAL

## 2024-09-27 PROCEDURE — 93798 PHYS/QHP OP CAR RHAB W/ECG: CPT

## 2024-09-30 ENCOUNTER — HOSPITAL ENCOUNTER (OUTPATIENT)
Dept: CARDIAC REHAB | Facility: CLINIC | Age: 64
Discharge: HOME | End: 2024-09-30
Attending: INTERNAL MEDICINE
Payer: COMMERCIAL

## 2024-09-30 PROCEDURE — 93798 PHYS/QHP OP CAR RHAB W/ECG: CPT

## 2024-10-02 ENCOUNTER — HOSPITAL ENCOUNTER (OUTPATIENT)
Dept: CARDIAC REHAB | Facility: CLINIC | Age: 64
Discharge: HOME | End: 2024-10-02
Attending: INTERNAL MEDICINE
Payer: COMMERCIAL

## 2024-10-02 PROCEDURE — 93798 PHYS/QHP OP CAR RHAB W/ECG: CPT

## 2024-10-04 ENCOUNTER — HOSPITAL ENCOUNTER (OUTPATIENT)
Dept: CARDIAC REHAB | Facility: CLINIC | Age: 64
Discharge: HOME | End: 2024-10-04
Attending: INTERNAL MEDICINE
Payer: COMMERCIAL

## 2024-10-04 PROCEDURE — 93798 PHYS/QHP OP CAR RHAB W/ECG: CPT

## 2024-10-07 ENCOUNTER — HOSPITAL ENCOUNTER (OUTPATIENT)
Dept: CARDIAC REHAB | Facility: CLINIC | Age: 64
Discharge: HOME | End: 2024-10-07
Attending: INTERNAL MEDICINE
Payer: COMMERCIAL

## 2024-10-07 PROCEDURE — 93798 PHYS/QHP OP CAR RHAB W/ECG: CPT

## 2024-10-09 ENCOUNTER — HOSPITAL ENCOUNTER (OUTPATIENT)
Dept: CARDIAC REHAB | Facility: CLINIC | Age: 64
Discharge: HOME | End: 2024-10-09
Attending: INTERNAL MEDICINE
Payer: COMMERCIAL

## 2024-10-09 PROCEDURE — 93798 PHYS/QHP OP CAR RHAB W/ECG: CPT

## 2024-10-11 ENCOUNTER — HOSPITAL ENCOUNTER (OUTPATIENT)
Dept: CARDIAC REHAB | Facility: CLINIC | Age: 64
Discharge: HOME | End: 2024-10-11
Attending: INTERNAL MEDICINE
Payer: COMMERCIAL

## 2024-10-11 PROCEDURE — 93798 PHYS/QHP OP CAR RHAB W/ECG: CPT

## 2024-10-14 ENCOUNTER — HOSPITAL ENCOUNTER (OUTPATIENT)
Dept: CARDIAC REHAB | Facility: CLINIC | Age: 64
Discharge: HOME | End: 2024-10-14
Attending: INTERNAL MEDICINE
Payer: COMMERCIAL

## 2024-10-14 PROCEDURE — 93798 PHYS/QHP OP CAR RHAB W/ECG: CPT

## 2024-10-16 ENCOUNTER — HOSPITAL ENCOUNTER (OUTPATIENT)
Dept: CARDIAC REHAB | Facility: CLINIC | Age: 64
Discharge: HOME | End: 2024-10-16
Attending: INTERNAL MEDICINE
Payer: COMMERCIAL

## 2024-10-16 PROCEDURE — 93798 PHYS/QHP OP CAR RHAB W/ECG: CPT

## 2024-10-18 ENCOUNTER — HOSPITAL ENCOUNTER (OUTPATIENT)
Dept: CARDIAC REHAB | Facility: CLINIC | Age: 64
Discharge: HOME | End: 2024-10-18
Attending: INTERNAL MEDICINE
Payer: COMMERCIAL

## 2024-10-18 PROCEDURE — 93798 PHYS/QHP OP CAR RHAB W/ECG: CPT

## 2024-10-21 ENCOUNTER — HOSPITAL ENCOUNTER (OUTPATIENT)
Dept: CARDIAC REHAB | Facility: CLINIC | Age: 64
Discharge: HOME | End: 2024-10-21
Attending: INTERNAL MEDICINE
Payer: COMMERCIAL

## 2024-10-21 PROCEDURE — 93798 PHYS/QHP OP CAR RHAB W/ECG: CPT

## 2024-10-25 ENCOUNTER — HOSPITAL ENCOUNTER (OUTPATIENT)
Dept: CARDIAC REHAB | Facility: CLINIC | Age: 64
Discharge: HOME | End: 2024-10-25
Attending: INTERNAL MEDICINE
Payer: COMMERCIAL

## 2024-10-25 PROCEDURE — 93798 PHYS/QHP OP CAR RHAB W/ECG: CPT

## 2024-10-28 ENCOUNTER — HOSPITAL ENCOUNTER (OUTPATIENT)
Dept: CARDIAC REHAB | Facility: CLINIC | Age: 64
Discharge: HOME | End: 2024-10-28
Attending: INTERNAL MEDICINE
Payer: COMMERCIAL

## 2024-10-28 PROCEDURE — 93798 PHYS/QHP OP CAR RHAB W/ECG: CPT

## 2024-10-28 PROCEDURE — 93797 PHYS/QHP OP CAR RHAB WO ECG: CPT
